# Patient Record
Sex: MALE
[De-identification: names, ages, dates, MRNs, and addresses within clinical notes are randomized per-mention and may not be internally consistent; named-entity substitution may affect disease eponyms.]

---

## 2020-08-14 ENCOUNTER — HOSPITAL ENCOUNTER (EMERGENCY)
Dept: HOSPITAL 88 - ER | Age: 20
Discharge: HOME | End: 2020-08-14
Payer: SELF-PAY

## 2020-08-14 VITALS — BODY MASS INDEX: 32.9 KG/M2 | WEIGHT: 235 LBS | HEIGHT: 71 IN

## 2020-08-14 VITALS — SYSTOLIC BLOOD PRESSURE: 124 MMHG | DIASTOLIC BLOOD PRESSURE: 87 MMHG

## 2020-08-14 DIAGNOSIS — R05: ICD-10-CM

## 2020-08-14 DIAGNOSIS — R50.9: ICD-10-CM

## 2020-08-14 DIAGNOSIS — B34.9: Primary | ICD-10-CM

## 2020-08-14 DIAGNOSIS — R53.1: ICD-10-CM

## 2020-08-14 LAB
FLUAV + FLUBV AG SPEC IF: NEGATIVE
S PYO AG THROAT QL: NEGATIVE

## 2020-08-14 PROCEDURE — 99284 EMERGENCY DEPT VISIT MOD MDM: CPT

## 2020-08-14 PROCEDURE — 71045 X-RAY EXAM CHEST 1 VIEW: CPT

## 2020-08-14 PROCEDURE — 87400 INFLUENZA A/B EACH AG IA: CPT

## 2020-08-14 PROCEDURE — 83518 IMMUNOASSAY DIPSTICK: CPT

## 2020-08-14 PROCEDURE — 87070 CULTURE OTHR SPECIMN AEROBIC: CPT

## 2020-08-14 NOTE — EMERGENCY DEPARTMENT NOTE
History of Present Illnes


History of Present Illness


Chief Complaint:  Respiratory


History of Present Illness


This is a 20 year old  male arrived to the ED with complaints of cough fever and

generalized malaise..





Chief Complaint Comment c/o n/v/d, sore throat, cough, congestion, decreased 

appetite, and ha all symptoms started 3 days ago, got worse yesterday, feels 

some improvement today states hx of asthma says he noticed himself wheezing does

not use an inhaler states he smokes 1 pk/cigarettes/day, marijuana and drinks 

occasionally


Historian:  Patient


Arrival Mode:  Car


Onset (how long ago):  day(s)


Severity:  mild


Duration (how long):  day(s)


Timing of current episode:  intermittent


Progression:  unchanged


Chronicity:  new


Context:  Reports recent illness


Relieving factors:  none





Past Medical/Family History


Physician Review


I have reviewed the patient's past medical and family history.  Any updates have

been documented here.





Past Medical History


Recent Fever:  No


Clinical Suspicion of Infectio:  Yes


New/Unexplained Change in Ment:  No


Past Medical History:  Anemia


Other Surgery:  


birth [facility] removed from left side of jaw





Social History


Smoking Cessation:  Current every day smoker


Counseling Performed:  Yes


Alcohol Use:  Occasional


Any Illegal Drug Use:  Yes (marijuana)


Physically hurt or threatened:  No





Other


Any Pre-Existing Lines (PICC,:  No





Review of Systems


Review of Systems


Constitutional:  Reports as per HPI, Reports chills, Reports fever, Reports 

malaise


EENTM:  Reports no symptoms


Cardiovascular:  Reports no symptoms


Respiratory:  Reports as per HPI, Reports cough


Gastrointestinal:  Reports no symptoms


Genitourinary:  Reports no symptoms


Musculoskeletal:  Reports no symptoms


Integumentary:  Reports no symptoms


Neurological:  Reports no symptoms


Psychological:  Reports no symptoms


Endocrine:  Reports no symptoms


Hematological/Lymphatic:  Reports no symptoms





Physical Exam


Related Data


Allergies:  


Coded Allergies:  


     No Known Allergies (Unverified , 8/14/20)


Triage Vital Signs





Vital Signs








  Date Time  Temp Pulse Resp B/P (MAP) Pulse Ox O2 Delivery O2 Flow Rate FiO2


 


8/14/20 13:04 99.1 68 18 134/94 97 Room Air  








Vital signs reviewed:  Yes





Physical Exam


CONSTITUTIONAL





Constitutional:  Present well-developed, Present well-nourished, Present obese


HENT


HENT:  Present normocephalic, Present atraumatic, Present oropharynx 

clear/moist, Present nose normal


HENT L/R:  Present left ext ear normal, Present right ext ear normal


EYES





Eyes:  Reports PERRL, Reports conjunctivae normal


NECK


Neck:  Present ROM normal


PULMONARY


Pulmonary:  Present effort normal, Present breath sounds normal


CARDIOVASCULAR





Cardiovascular:  Present regular rhythm, Present heart sounds normal, Present 

capillary refill normal, Present normal rate


GASTROINTESTINAL





Abdominal:  Present soft, Present nontender, Present bowel sounds normal


GENITOURINARY





Genitourinary:  Present exam deferred


SKIN


Skin:  Present warm, Present dry


MUSCULOSKELETAL





Musculoskeletal:  Present ROM normal


NEUROLOGICAL





Neurological:  Present alert, Present oriented x 3, Present no gross motor or 

sensory deficits


PSYCHOLOGICAL


Psychological:  Present mood/affect normal, Present judgement normal





Results


Imaging


Imaging results reviewed:  Yes





Assessment & Plan


Medical Decision Making


MDM


20-year-old male arrives to the ED with cough fever generalized weakness. Chest 

x-ray unremarkable. Patient given steroids as the symptoms may be related to a 

viral syndrome and may trigger his underlying asthma. Patient not hypoxic no 

evidence tachypnea. Patient stable for discharge home.





Assessment & Plan


Final Impression:  


(1) Viral syndrome


Depart Disposition:  HOME, SELF-CARE


Last Vital Signs











  Date Time  Temp Pulse Resp B/P (MAP) Pulse Ox O2 Delivery O2 Flow Rate FiO2


 


8/14/20 13:04 99.1 68 18 134/94 97 Room Air  

















CHI SANZ DO            Aug 14, 2020 13:28

## 2020-08-14 NOTE — DIAGNOSTIC IMAGING REPORT
EXAM:  CHEST SINGLE (PORTABLE)



DATE: 8/14/2020 1:20 PM  



COMPARISON: None



FINDINGS:

The trachea is midline. The lungs are symmetrically expanded without evidence

for large focal consolidation, pneumothorax, or significant pleural effusion.



The cardiomediastinal silhouette and pulmonary vasculature are within normal

limits. No acute osseous abnormality is identified. The surrounding soft

tissues are unremarkable.





IMPRESSION:



No acute cardiopulmonary process identified.



Signed by: Dr. Neo Powell MD on 8/14/2020 2:09 PM

## 2020-08-14 NOTE — XMS REPORT
Continuity of Care Document

                             Created on: 2020



ZAYDA FIORE

External Reference #: [id number]

: 2000

Sex: Male



Demographics





                          Address                   [address] [place] [address]  [id number]

 

                          Home Phone                [phone]

 

                          Preferred Language        Unknown

 

                          Marital Status            Unknown

 

                          Anabaptist Affiliation     Unknown

 

                          Race                      Unknown

 

                          Ethnic Group              Unknown





Author





                          Author                    [facility]

 

                          Organization              [facility]

 

                          Address                   [address] [place] [address]  [id number]



 

                          Phone                     Unavailable







Care Team Providers





                    Care Team Member Name Role                Phone

 

                    KARIN SANZ             Unavailable







Problems

This patient has no known problems.



Allergies, Adverse Reactions, Alerts

This patient has no known allergies or adverse reactions.



Medications

This patient has no known medications.



Procedures

This patient has no known procedures.



Results





           Test Description Test Time  Test Comments Results    Result Comments 

Source

 

                CHEST SINGLE (PORTABLE) 2020 14:09:00                     

                              

                                                   [place] [address]      Patient Name: ZAYDA FIORE                               
MR #: [id number]                  : 2000                               
   Age/Sex: 20/M  Acct #: [id number]                              Req #: [id number]
[id number]  Adm Physician:                                                      
Ordered by: CHI SANZ DO                         Report #: [id number]    
   Location: ER                                      Room/Bed:                  
 
________________________________________________________________________________

___________________    Procedure: [id number] DX/CHEST SINGLE (PORTABLE)  Exam 
Date: 20                            Exam Time: 1320                       
                      REPORT STATUS: Signed    EXAM:  CHEST SINGLE (PORTABLE)   
  DATE: 2020 1:20 PM        COMPARISON: None      FINDINGS:   The trachea 
is midline. The lungs are symmetrically expanded without evidence   for large 
focal consolidation, pneumothorax, or significant pleural effusion.      The 
cardiomediastinal silhouette and pulmonary vasculature are within normal   
limits. No acute osseous abnormality is identified. The surrounding soft   
tissues are unremarkable.         IMPRESSION:      No acute cardiopulmonary 
process identified.      Signed by: Dr. Neo Powell MD on 2020 2:09 PM   
    Dictated By: NEO POWELL MD  Electronically Signed By: NEO POWELL MD on 
20  Transcribed By: TRIPP on 20       COPY TO:   
CHI SANZ DO